# Patient Record
Sex: FEMALE | Race: WHITE | NOT HISPANIC OR LATINO | URBAN - METROPOLITAN AREA
[De-identification: names, ages, dates, MRNs, and addresses within clinical notes are randomized per-mention and may not be internally consistent; named-entity substitution may affect disease eponyms.]

---

## 2020-06-09 ENCOUNTER — TELEPHONE ENCOUNTER (OUTPATIENT)
Dept: URBAN - METROPOLITAN AREA CLINIC 19 | Facility: CLINIC | Age: 75
End: 2020-06-09

## 2020-06-17 ENCOUNTER — OFFICE VISIT (OUTPATIENT)
Dept: URBAN - METROPOLITAN AREA CLINIC 19 | Facility: CLINIC | Age: 75
End: 2020-06-17
Payer: COMMERCIAL

## 2020-06-17 DIAGNOSIS — K59.01 SLOW TRANSIT CONSTIPATION: ICD-10-CM

## 2020-06-17 DIAGNOSIS — K57.31 DIVERTICULOSIS OF LARGE INTESTINE WITH HEMORRHAGE: ICD-10-CM

## 2020-06-17 PROCEDURE — 3017F COLORECTAL CA SCREEN DOC REV: CPT | Performed by: INTERNAL MEDICINE

## 2020-06-17 PROCEDURE — G8427 DOCREV CUR MEDS BY ELIG CLIN: HCPCS | Performed by: INTERNAL MEDICINE

## 2020-06-17 PROCEDURE — G9903 PT SCRN TBCO ID AS NON USER: HCPCS | Performed by: INTERNAL MEDICINE

## 2020-06-17 PROCEDURE — 99214 OFFICE O/P EST MOD 30 MIN: CPT | Performed by: INTERNAL MEDICINE

## 2020-06-17 PROCEDURE — G8417 CALC BMI ABV UP PARAM F/U: HCPCS | Performed by: INTERNAL MEDICINE

## 2020-06-17 RX ORDER — MELATONIN 5 MG
TABLET ORAL
Qty: 0 | Refills: 0 | Status: ACTIVE | COMMUNITY
Start: 1900-01-01

## 2020-06-17 RX ORDER — LINACLOTIDE 290 UG/1
TAKE 1 CAPSULE (290 MCG) BY ORAL ROUTE ONCE DAILY ON AN EMPTY STOMACH AT LEAST 30 MINUTES BEFORE 1ST MEAL OF THE DAY CAPSULE, GELATIN COATED ORAL 1
Qty: 0 | Refills: 0 | Status: ACTIVE | COMMUNITY
Start: 1900-01-01

## 2020-06-17 RX ORDER — SIMVASTATIN 40 MG/1
TAKE 1 TABLET (40 MG) BY ORAL ROUTE ONCE DAILY IN THE EVENING TABLET, FILM COATED ORAL 1
Qty: 0 | Refills: 0 | Status: ACTIVE | COMMUNITY
Start: 1900-01-01

## 2020-06-17 RX ORDER — LOSARTAN POTASSIUM AND HYDROCHLOROTHIAZIDE 100; 25 MG/1; MG/1
TAKE 1 TABLET BY ORAL ROUTE ONCE DAILY TABLET, FILM COATED ORAL 1
Qty: 0 | Refills: 0 | Status: ACTIVE | COMMUNITY
Start: 1900-01-01

## 2020-06-17 RX ORDER — AMLODIPINE BESYLATE 5 MG/1
TAKE 1 TABLET (5 MG) BY ORAL ROUTE ONCE DAILY TABLET ORAL 1
Qty: 0 | Refills: 0 | Status: ACTIVE | COMMUNITY
Start: 1900-01-01

## 2020-06-17 RX ORDER — SOLIFENACIN SUCCINATE 10 MG/1
TAKE 1 TABLET (10 MG) BY ORAL ROUTE ONCE DAILY TABLET, FILM COATED ORAL 1
Qty: 0 | Refills: 0 | Status: ACTIVE | COMMUNITY
Start: 1900-01-01

## 2020-06-17 RX ORDER — LINACLOTIDE 290 UG/1
1 CAPSULE AT LEAST 30 MINUTES BEFORE THE FIRST MEAL OF THE DAY ON AN EMPTY STOMACH CAPSULE, GELATIN COATED ORAL ONCE A DAY
Qty: 90 | Refills: 3 | OUTPATIENT
Start: 2020-06-17 | End: 2021-06-12

## 2020-06-17 RX ORDER — EZETIMIBE 10 MG/1
TABLET ORAL
Qty: 0 | Refills: 0 | Status: ACTIVE | COMMUNITY
Start: 1900-01-01

## 2020-06-17 RX ORDER — MELOXICAM 7.5 MG/1
TAKE 1 TABLET (7.5 MG) BY ORAL ROUTE ONCE DAILY TABLET ORAL 1
Qty: 0 | Refills: 0 | Status: ACTIVE | COMMUNITY
Start: 1900-01-01

## 2020-06-17 RX ORDER — PLECANATIDE 3 MG/1
TAKE ONE TABLET BY MOUTH ONE TIME DAILY TABLET ORAL
Qty: 90 | Refills: 3 | Status: ACTIVE | COMMUNITY
Start: 2019-12-31

## 2020-06-17 RX ORDER — CHLORHEXIDINE GLUCONATE 4 %
LIQUID (ML) TOPICAL
Qty: 0 | Refills: 0 | Status: ACTIVE | COMMUNITY
Start: 1900-01-01

## 2020-06-17 RX ORDER — VENLAFAXINE HYDROCHLORIDE 75 MG/1
TABLET ORAL
Qty: 0 | Refills: 0 | Status: ACTIVE | COMMUNITY
Start: 1900-01-01

## 2020-06-17 NOTE — HPI-TODAY'S VISIT:
The patient is a 72 year old /White female who is being seen in follow up after recent hospitalization.  7/9/18: Patient does complain of some constipation issues. For the past week, she has had tenesmus with varying consistency of stools, sometimes hard and sometimes soft. She has used glycerine suppositories with some success. This usually leads to one large bowel movement with relief of tenesmus for a few days.   8/28/18: Patient presents today for reevaluation of constipation. On 7/16/18, I performed a colonscopy that revealed 2 polyps and sigmoid diverticulosis. On 7/26/18, she had a Sitz marker study consistent with colonic inertia (all markers in the left colon), although there may be an element of pelvic floor dysfunction as well. I had prescribed Amitiza 24 mcg po BID at her last visit. I had ordered anorectal manometry and referred patient to [facility] to try biofeedback or other therapies. She is scheduled for manometry next month and seeing [facility] tomorrow. Amitiza did not work.  6/18/20: Patient presents for follow-up after recent hospitalization (5/23/20-5/26/20) at [facility].  She apparently had a syncopal episode while in the bathroom and had intense abdominal pain followed by hematochezia soon afterwards.  CT showed "colitis" of the descending colon and proximal sigmoid colon.  Colonoscopy performed by Dr. Nina Lara did show some erythemia in that area, but the biopsies were completely normal.  It is my belief that the patient had a diverticular bleed that resolved quickly, although I must admit that the pain is somewhat atypical.  In either case, she has had no bleeding since.  She has been taking meloxicam without any adverse events which goes against IBD, but she had been prescribed Rowasa enemas to take nightly, which she did for a couple of weeks.  I have low suspicion for IBD at this point, given the normal biopsies.  She mainly complains of constipation again - she has not had a BM in 5 days.

## 2020-06-25 ENCOUNTER — TELEPHONE ENCOUNTER (OUTPATIENT)
Dept: URBAN - METROPOLITAN AREA CLINIC 92 | Facility: CLINIC | Age: 75
End: 2020-06-25

## 2020-09-15 ENCOUNTER — OFFICE VISIT (OUTPATIENT)
Dept: URBAN - METROPOLITAN AREA CLINIC 19 | Facility: CLINIC | Age: 75
End: 2020-09-15
Payer: COMMERCIAL

## 2020-09-15 DIAGNOSIS — K57.31 DIVERTICULOSIS OF LARGE INTESTINE WITH HEMORRHAGE: ICD-10-CM

## 2020-09-15 DIAGNOSIS — K59.01 SLOW TRANSIT CONSTIPATION: ICD-10-CM

## 2020-09-15 PROCEDURE — G8420 CALC BMI NORM PARAMETERS: HCPCS | Performed by: INTERNAL MEDICINE

## 2020-09-15 PROCEDURE — 3017F COLORECTAL CA SCREEN DOC REV: CPT | Performed by: INTERNAL MEDICINE

## 2020-09-15 PROCEDURE — 99213 OFFICE O/P EST LOW 20 MIN: CPT | Performed by: INTERNAL MEDICINE

## 2020-09-15 PROCEDURE — G9903 PT SCRN TBCO ID AS NON USER: HCPCS | Performed by: INTERNAL MEDICINE

## 2020-09-15 PROCEDURE — G8427 DOCREV CUR MEDS BY ELIG CLIN: HCPCS | Performed by: INTERNAL MEDICINE

## 2020-09-15 RX ORDER — SOLIFENACIN SUCCINATE 10 MG/1
TAKE 1 TABLET (10 MG) BY ORAL ROUTE ONCE DAILY TABLET, FILM COATED ORAL 1
Qty: 0 | Refills: 0 | Status: ACTIVE | COMMUNITY
Start: 1900-01-01

## 2020-09-15 RX ORDER — CHLORHEXIDINE GLUCONATE 4 %
LIQUID (ML) TOPICAL
Qty: 0 | Refills: 0 | Status: ACTIVE | COMMUNITY
Start: 1900-01-01

## 2020-09-15 RX ORDER — SIMVASTATIN 40 MG/1
TAKE 1 TABLET (40 MG) BY ORAL ROUTE ONCE DAILY IN THE EVENING TABLET, FILM COATED ORAL 1
Qty: 0 | Refills: 0 | Status: ACTIVE | COMMUNITY
Start: 1900-01-01

## 2020-09-15 RX ORDER — LUBIPROSTONE 24 UG/1
1 CAPSULE WITH FOOD AND WATER CAPSULE, GELATIN COATED ORAL TWICE A DAY
Qty: 180 CAPSULE | Refills: 3 | OUTPATIENT
Start: 2020-09-15 | End: 2021-09-10

## 2020-09-15 RX ORDER — PLECANATIDE 3 MG/1
TAKE ONE TABLET BY MOUTH ONE TIME DAILY TABLET ORAL
Qty: 90 | Refills: 3 | Status: ACTIVE | COMMUNITY
Start: 2019-12-31

## 2020-09-15 RX ORDER — MELATONIN 5 MG
TABLET ORAL
Qty: 0 | Refills: 0 | Status: ACTIVE | COMMUNITY
Start: 1900-01-01

## 2020-09-15 RX ORDER — VENLAFAXINE HYDROCHLORIDE 75 MG/1
TABLET ORAL
Qty: 0 | Refills: 0 | Status: ACTIVE | COMMUNITY
Start: 1900-01-01

## 2020-09-15 RX ORDER — LOSARTAN POTASSIUM AND HYDROCHLOROTHIAZIDE 100; 25 MG/1; MG/1
TAKE 1 TABLET BY ORAL ROUTE ONCE DAILY TABLET, FILM COATED ORAL 1
Qty: 0 | Refills: 0 | Status: ACTIVE | COMMUNITY
Start: 1900-01-01

## 2020-09-15 RX ORDER — AMLODIPINE BESYLATE 5 MG/1
TAKE 1 TABLET (5 MG) BY ORAL ROUTE ONCE DAILY TABLET ORAL 1
Qty: 0 | Refills: 0 | Status: ACTIVE | COMMUNITY
Start: 1900-01-01

## 2020-09-15 RX ORDER — MELOXICAM 7.5 MG/1
TAKE 1 TABLET (7.5 MG) BY ORAL ROUTE ONCE DAILY TABLET ORAL 1
Qty: 0 | Refills: 0 | Status: ACTIVE | COMMUNITY
Start: 1900-01-01

## 2020-09-15 RX ORDER — LINACLOTIDE 290 UG/1
TAKE 1 CAPSULE (290 MCG) BY ORAL ROUTE ONCE DAILY ON AN EMPTY STOMACH AT LEAST 30 MINUTES BEFORE 1ST MEAL OF THE DAY CAPSULE, GELATIN COATED ORAL 1
Qty: 0 | Refills: 0 | Status: ACTIVE | COMMUNITY
Start: 1900-01-01

## 2020-09-15 RX ORDER — LINACLOTIDE 290 UG/1
1 CAPSULE AT LEAST 30 MINUTES BEFORE THE FIRST MEAL OF THE DAY ON AN EMPTY STOMACH CAPSULE, GELATIN COATED ORAL ONCE A DAY
Qty: 90 | Refills: 3 | Status: ACTIVE | COMMUNITY
Start: 2020-06-17 | End: 2021-06-12

## 2020-09-15 RX ORDER — EZETIMIBE 10 MG/1
TABLET ORAL
Qty: 0 | Refills: 0 | Status: ACTIVE | COMMUNITY
Start: 1900-01-01

## 2020-09-15 NOTE — HPI-TODAY'S VISIT:
The patient is a 72 year old /White female who is being seen in follow up after recent hospitalization.  7/9/18: Patient does complain of some constipation issues. For the past week, she has had tenesmus with varying consistency of stools, sometimes hard and sometimes soft. She has used glycerine suppositories with some success. This usually leads to one large bowel movement with relief of tenesmus for a few days.   8/28/18: Patient presents today for reevaluation of constipation. On 7/16/18, I performed a colonscopy that revealed 2 polyps and sigmoid diverticulosis. On 7/26/18, she had a Sitz marker study consistent with colonic inertia (all markers in the left colon), although there may be an element of pelvic floor dysfunction as well. I had prescribed Amitiza 24 mcg po BID at her last visit. I had ordered anorectal manometry and referred patient to [facility] to try biofeedback or other therapies. She is scheduled for manometry next month and seeing [facility] tomorrow. Amitiza did not work.  6/18/20: Patient presents for follow-up after recent hospitalization (5/23/20-5/26/20) at [facility].  She apparently had a syncopal episode while in the bathroom and had intense abdominal pain followed by hematochezia soon afterwards.  CT showed "colitis" of the descending colon and proximal sigmoid colon.  Colonoscopy performed by Dr. Nina Lara did show some erythemia in that area, but the biopsies were completely normal.  It is my belief that the patient had a diverticular bleed that resolved quickly, although I must admit that the pain is somewhat atypical.  In either case, she has had no bleeding since.  She has been taking meloxicam without any adverse events which goes against IBD, but she had been prescribed Rowasa enemas to take nightly, which she did for a couple of weeks.  I have low suspicion for IBD at this point, given the normal biopsies.  She mainly complains of constipation again - she has not had a BM in 5 days.  9/15/20:  Patient still not able to have a BM except for once a week. Linzess with Trulance not working.

## 2020-09-23 ENCOUNTER — WEB ENCOUNTER (OUTPATIENT)
Dept: URBAN - METROPOLITAN AREA CLINIC 19 | Facility: CLINIC | Age: 75
End: 2020-09-23

## 2020-09-23 ENCOUNTER — OFFICE VISIT (OUTPATIENT)
Dept: URBAN - METROPOLITAN AREA CLINIC 19 | Facility: CLINIC | Age: 75
End: 2020-09-23
Payer: COMMERCIAL

## 2020-09-23 DIAGNOSIS — K57.31 DIVERTICULOSIS OF LARGE INTESTINE WITH HEMORRHAGE: ICD-10-CM

## 2020-09-23 DIAGNOSIS — K59.01 SLOW TRANSIT CONSTIPATION: ICD-10-CM

## 2020-09-23 DIAGNOSIS — Z12.11 COLON CANCER SCREENING: ICD-10-CM

## 2020-09-23 PROBLEM — 724538004: Status: ACTIVE | Noted: 2020-06-17

## 2020-09-23 PROBLEM — 35298007: Status: ACTIVE | Noted: 2020-06-17

## 2020-09-23 PROCEDURE — 99214 OFFICE O/P EST MOD 30 MIN: CPT | Performed by: INTERNAL MEDICINE

## 2020-09-23 PROCEDURE — G8427 DOCREV CUR MEDS BY ELIG CLIN: HCPCS | Performed by: INTERNAL MEDICINE

## 2020-09-23 PROCEDURE — 3017F COLORECTAL CA SCREEN DOC REV: CPT | Performed by: INTERNAL MEDICINE

## 2020-09-23 PROCEDURE — G8417 CALC BMI ABV UP PARAM F/U: HCPCS | Performed by: INTERNAL MEDICINE

## 2020-09-23 PROCEDURE — G9903 PT SCRN TBCO ID AS NON USER: HCPCS | Performed by: INTERNAL MEDICINE

## 2020-09-23 RX ORDER — BISACODYL 10 MG
1 SUPPOSITORY AS NEEDED SUPPOSITORY, RECTAL RECTAL ONCE A DAY
Qty: 30 | Refills: 3 | OUTPATIENT

## 2020-09-23 RX ORDER — MELOXICAM 7.5 MG/1
TAKE 1 TABLET (7.5 MG) BY ORAL ROUTE ONCE DAILY TABLET ORAL 1
Qty: 0 | Refills: 0 | Status: ACTIVE | COMMUNITY
Start: 1900-01-01

## 2020-09-23 RX ORDER — LINACLOTIDE 290 UG/1
1 CAPSULE AT LEAST 30 MINUTES BEFORE THE FIRST MEAL OF THE DAY ON AN EMPTY STOMACH CAPSULE, GELATIN COATED ORAL ONCE A DAY
Qty: 90 | Refills: 3 | Status: ACTIVE | COMMUNITY
Start: 2020-06-17 | End: 2021-06-12

## 2020-09-23 RX ORDER — LINACLOTIDE 290 UG/1
TAKE 1 CAPSULE (290 MCG) BY ORAL ROUTE ONCE DAILY ON AN EMPTY STOMACH AT LEAST 30 MINUTES BEFORE 1ST MEAL OF THE DAY CAPSULE, GELATIN COATED ORAL 1
Qty: 0 | Refills: 0 | Status: ACTIVE | COMMUNITY
Start: 1900-01-01

## 2020-09-23 RX ORDER — LINACLOTIDE 290 UG/1
1 CAPSULE AT LEAST 30 MINUTES BEFORE THE FIRST MEAL OF THE DAY ON AN EMPTY STOMACH CAPSULE, GELATIN COATED ORAL ONCE A DAY
OUTPATIENT
Start: 2020-06-17 | End: 2021-06-12

## 2020-09-23 RX ORDER — LUBIPROSTONE 24 UG/1
1 CAPSULE WITH FOOD AND WATER CAPSULE, GELATIN COATED ORAL TWICE A DAY
OUTPATIENT
Start: 2020-09-15 | End: 2021-09-10

## 2020-09-23 RX ORDER — LINACLOTIDE 290 UG/1
TAKE 1 CAPSULE (290 MCG) BY ORAL ROUTE ONCE DAILY ON AN EMPTY STOMACH AT LEAST 30 MINUTES BEFORE 1ST MEAL OF THE DAY CAPSULE, GELATIN COATED ORAL 1
OUTPATIENT
Start: 1900-01-01

## 2020-09-23 RX ORDER — VENLAFAXINE HYDROCHLORIDE 75 MG/1
TABLET ORAL
Qty: 0 | Refills: 0 | Status: ACTIVE | COMMUNITY
Start: 1900-01-01

## 2020-09-23 RX ORDER — AMLODIPINE BESYLATE 5 MG/1
TAKE 1 TABLET (5 MG) BY ORAL ROUTE ONCE DAILY TABLET ORAL 1
Qty: 0 | Refills: 0 | Status: ACTIVE | COMMUNITY
Start: 1900-01-01

## 2020-09-23 RX ORDER — PLECANATIDE 3 MG/1
TAKE ONE TABLET BY MOUTH ONE TIME DAILY TABLET ORAL
Qty: 90 | Refills: 3 | Status: ACTIVE | COMMUNITY
Start: 2019-12-31

## 2020-09-23 RX ORDER — PRUCALOPRIDE 2 MG/1
1 TABLET TABLET, FILM COATED ORAL ONCE A DAY
Qty: 90 TABLET | Refills: 3 | OUTPATIENT
Start: 2020-09-23 | End: 2021-09-18

## 2020-09-23 RX ORDER — CHLORHEXIDINE GLUCONATE 4 %
LIQUID (ML) TOPICAL
Qty: 0 | Refills: 0 | Status: ACTIVE | COMMUNITY
Start: 1900-01-01

## 2020-09-23 RX ORDER — PLECANATIDE 3 MG/1
TAKE ONE TABLET BY MOUTH ONE TIME DAILY TABLET ORAL
OUTPATIENT
Start: 2019-12-31

## 2020-09-23 RX ORDER — MELATONIN 5 MG
TABLET ORAL
Qty: 0 | Refills: 0 | Status: ACTIVE | COMMUNITY
Start: 1900-01-01

## 2020-09-23 RX ORDER — EZETIMIBE 10 MG/1
TABLET ORAL
Qty: 0 | Refills: 0 | Status: ACTIVE | COMMUNITY
Start: 1900-01-01

## 2020-09-23 RX ORDER — SIMVASTATIN 40 MG/1
TAKE 1 TABLET (40 MG) BY ORAL ROUTE ONCE DAILY IN THE EVENING TABLET, FILM COATED ORAL 1
Qty: 0 | Refills: 0 | Status: ACTIVE | COMMUNITY
Start: 1900-01-01

## 2020-09-23 RX ORDER — LOSARTAN POTASSIUM AND HYDROCHLOROTHIAZIDE 100; 25 MG/1; MG/1
TAKE 1 TABLET BY ORAL ROUTE ONCE DAILY TABLET, FILM COATED ORAL 1
Qty: 0 | Refills: 0 | Status: ACTIVE | COMMUNITY
Start: 1900-01-01

## 2020-09-23 RX ORDER — SOLIFENACIN SUCCINATE 10 MG/1
TAKE 1 TABLET (10 MG) BY ORAL ROUTE ONCE DAILY TABLET, FILM COATED ORAL 1
Qty: 0 | Refills: 0 | Status: ACTIVE | COMMUNITY
Start: 1900-01-01

## 2020-09-23 RX ORDER — LUBIPROSTONE 24 UG/1
1 CAPSULE WITH FOOD AND WATER CAPSULE, GELATIN COATED ORAL TWICE A DAY
Qty: 180 CAPSULE | Refills: 3 | Status: ACTIVE | COMMUNITY
Start: 2020-09-15 | End: 2021-09-10

## 2020-09-23 NOTE — HPI-TODAY'S VISIT:
The patient is a 72 year old /White female who is being seen in follow up after recent hospitalization.   7/9/18: Patient does complain of some constipation issues. For the past week, she has had tenesmus with varying consistency of stools, sometimes hard and sometimes soft. She has used glycerine suppositories with some success. This usually leads to one large bowel movement with relief of tenesmus for a few days.   8/28/18: Patient presents today for reevaluation of constipation. On 7/16/18, I performed a colonscopy that revealed 2 polyps and sigmoid diverticulosis. On 7/26/18, she had a Sitz marker study consistent with colonic inertia (all markers in the left colon), although there may be an element of pelvic floor dysfunction as well. I had prescribed Amitiza 24 mcg po BID at her last visit. I had ordered anorectal manometry and referred patient to [facility] to try biofeedback or other therapies. She is scheduled for manometry next month and seeing [facility] tomorrow. Amitiza did not work.  6/18/20: Patient presents for follow-up after recent hospitalization (5/23/20-5/26/20) at [facility].  She apparently had a syncopal episode while in the bathroom and had intense abdominal pain followed by hematochezia soon afterwards.  CT showed "colitis" of the descending colon and proximal sigmoid colon.  Colonoscopy performed by Dr. Nina Lara did show some erythemia in that area, but the biopsies were completely normal.  It is my belief that the patient had a diverticular bleed that resolved quickly, although I must admit that the pain is somewhat atypical.  In either case, she has had no bleeding since.  She has been taking meloxicam without any adverse events which goes against IBD, but she had been prescribed Rowasa enemas to take nightly, which she did for a couple of weeks.  I have low suspicion for IBD at this point, given the normal biopsies.  She mainly complains of constipation again - she has not had a BM in 5 days.  9/15/20:  Patient still not able to have a BM except for once a week. Linzess with Trulance not working.  9/23/2020:  she comes in for follow up.  has been unable to go to  her bowels on amitiza 24.  previously failed linzess, trulance, colace, enemas, or sennakot.   she believes she had pelvic floor therapy in the back with relief back then but has lost benefit recently no assoc hemazothezia she is having abd pressure/bloating without elsa pain she is drinking ample water she walks regularly but unable to advance her constipation no n/v normal appetite stable weight  no other complaints.    last colon in the hospital in 5/2020 discussed above.

## 2020-09-29 ENCOUNTER — OFFICE VISIT (OUTPATIENT)
Dept: URBAN - METROPOLITAN AREA CLINIC 19 | Facility: CLINIC | Age: 75
End: 2020-09-29
Payer: COMMERCIAL

## 2020-09-29 ENCOUNTER — DASHBOARD ENCOUNTERS (OUTPATIENT)
Age: 75
End: 2020-09-29

## 2020-09-29 DIAGNOSIS — K59.01 SLOW TRANSIT CONSTIPATION: ICD-10-CM

## 2020-09-29 DIAGNOSIS — K57.31 DIVERTICULOSIS OF LARGE INTESTINE WITH HEMORRHAGE: ICD-10-CM

## 2020-09-29 DIAGNOSIS — Z12.11 COLON CANCER SCREENING: ICD-10-CM

## 2020-09-29 PROCEDURE — G8417 CALC BMI ABV UP PARAM F/U: HCPCS | Performed by: INTERNAL MEDICINE

## 2020-09-29 PROCEDURE — G9903 PT SCRN TBCO ID AS NON USER: HCPCS | Performed by: INTERNAL MEDICINE

## 2020-09-29 PROCEDURE — G8427 DOCREV CUR MEDS BY ELIG CLIN: HCPCS | Performed by: INTERNAL MEDICINE

## 2020-09-29 PROCEDURE — 3017F COLORECTAL CA SCREEN DOC REV: CPT | Performed by: INTERNAL MEDICINE

## 2020-09-29 PROCEDURE — 99213 OFFICE O/P EST LOW 20 MIN: CPT | Performed by: INTERNAL MEDICINE

## 2020-09-29 RX ORDER — MELATONIN 5 MG
TABLET ORAL
Qty: 0 | Refills: 0 | Status: ACTIVE | COMMUNITY
Start: 1900-01-01

## 2020-09-29 RX ORDER — LOSARTAN POTASSIUM AND HYDROCHLOROTHIAZIDE 100; 25 MG/1; MG/1
TAKE 1 TABLET BY ORAL ROUTE ONCE DAILY TABLET, FILM COATED ORAL 1
Qty: 0 | Refills: 0 | Status: ACTIVE | COMMUNITY
Start: 1900-01-01

## 2020-09-29 RX ORDER — AMLODIPINE BESYLATE 5 MG/1
TAKE 1 TABLET (5 MG) BY ORAL ROUTE ONCE DAILY TABLET ORAL 1
Qty: 0 | Refills: 0 | Status: ACTIVE | COMMUNITY
Start: 1900-01-01

## 2020-09-29 RX ORDER — EZETIMIBE 10 MG/1
TABLET ORAL
Qty: 0 | Refills: 0 | Status: ACTIVE | COMMUNITY
Start: 1900-01-01

## 2020-09-29 RX ORDER — MELOXICAM 7.5 MG/1
TAKE 1 TABLET (7.5 MG) BY ORAL ROUTE ONCE DAILY TABLET ORAL 1
Qty: 0 | Refills: 0 | Status: ACTIVE | COMMUNITY
Start: 1900-01-01

## 2020-09-29 RX ORDER — SIMVASTATIN 40 MG/1
TAKE 1 TABLET (40 MG) BY ORAL ROUTE ONCE DAILY IN THE EVENING TABLET, FILM COATED ORAL 1
Qty: 0 | Refills: 0 | Status: ACTIVE | COMMUNITY
Start: 1900-01-01

## 2020-09-29 RX ORDER — BISACODYL 10 MG
1 SUPPOSITORY AS NEEDED SUPPOSITORY, RECTAL RECTAL ONCE A DAY
Qty: 30 | Refills: 3 | Status: ACTIVE | COMMUNITY

## 2020-09-29 RX ORDER — PRUCALOPRIDE 2 MG/1
1 TABLET TABLET, FILM COATED ORAL ONCE A DAY
Qty: 90 TABLET | Refills: 3 | Status: ACTIVE | COMMUNITY
Start: 2020-09-23 | End: 2021-09-18

## 2020-09-29 RX ORDER — SOLIFENACIN SUCCINATE 10 MG/1
TAKE 1 TABLET (10 MG) BY ORAL ROUTE ONCE DAILY TABLET, FILM COATED ORAL 1
Qty: 0 | Refills: 0 | Status: ACTIVE | COMMUNITY
Start: 1900-01-01

## 2020-09-29 RX ORDER — VENLAFAXINE HYDROCHLORIDE 75 MG/1
TABLET ORAL
Qty: 0 | Refills: 0 | Status: ACTIVE | COMMUNITY
Start: 1900-01-01

## 2020-09-29 RX ORDER — CHLORHEXIDINE GLUCONATE 4 %
LIQUID (ML) TOPICAL
Qty: 0 | Refills: 0 | Status: ACTIVE | COMMUNITY
Start: 1900-01-01

## 2020-09-29 NOTE — HPI-TODAY'S VISIT:
The patient is a 72 year old /White female who is being seen in follow up after recent hospitalization.   7/9/18: Patient does complain of some constipation issues. For the past week, she has had tenesmus with varying consistency of stools, sometimes hard and sometimes soft. She has used glycerine suppositories with some success. This usually leads to one large bowel movement with relief of tenesmus for a few days.   8/28/18: Patient presents today for reevaluation of constipation. On 7/16/18, I performed a colonscopy that revealed 2 polyps and sigmoid diverticulosis. On 7/26/18, she had a Sitz marker study consistent with colonic inertia (all markers in the left colon), although there may be an element of pelvic floor dysfunction as well. I had prescribed Amitiza 24 mcg po BID at her last visit. I had ordered anorectal manometry and referred patient to [facility] to try biofeedback or other therapies. She is scheduled for manometry next month and seeing [facility] tomorrow. Amitiza did not work.  6/18/20: Patient presents for follow-up after recent hospitalization (5/23/20-5/26/20) at [facility].  She apparently had a syncopal episode while in the bathroom and had intense abdominal pain followed by hematochezia soon afterwards.  CT showed "colitis" of the descending colon and proximal sigmoid colon.  Colonoscopy performed by Dr. Nina Lara did show some erythemia in that area, but the biopsies were completely normal.  It is my belief that the patient had a diverticular bleed that resolved quickly, although I must admit that the pain is somewhat atypical.  In either case, she has had no bleeding since.  She has been taking meloxicam without any adverse events which goes against IBD, but she had been prescribed Rowasa enemas to take nightly, which she did for a couple of weeks.  I have low suspicion for IBD at this point, given the normal biopsies.  She mainly complains of constipation again - she has not had a BM in 5 days.  9/15/20:  Patient still not able to have a BM except for once a week. Linzess with Trulance not working.  9/23/2020:  she comes in for follow up.  has been unable to go to  her bowels on amitiza 24.  previously failed linzess, trulance, colace, enemas, or sennakot.   she believes she had pelvic floor therapy in the back with relief back then but has lost benefit recently no assoc hemazothezia she is having abd pressure/bloating without elsa pain she is drinking ample water she walks regularly but unable to advance her constipation no n/v normal appetite stable weight  no other complaints.    last colon in the hospital in 5/2020 discussed above.  9/29/20:  Patient presents for rapid follow-up after seeing my colleague, Dr. Jin Rodriguez, less than a week ago.  She states that she already had the appointment, so she decided to keep it.  Dr. Rodriguez addressed her concerns that she had not had a "good" bowel movement in weeks, and he had ordered a MR defecography, which is scheduled for tomorrow.  Motegrity did not work, so she believes that she must have a blockage, either due to pelvic floor dysfunction, a rectocele, or rectal prolapse.

## 2020-09-30 ENCOUNTER — LAB OUTSIDE AN ENCOUNTER (OUTPATIENT)
Dept: URBAN - METROPOLITAN AREA CLINIC 80 | Facility: CLINIC | Age: 75
End: 2020-09-30

## 2020-09-30 ENCOUNTER — TELEPHONE ENCOUNTER (OUTPATIENT)
Dept: URBAN - METROPOLITAN AREA CLINIC 19 | Facility: CLINIC | Age: 75
End: 2020-09-30

## 2020-10-06 ENCOUNTER — TELEPHONE ENCOUNTER (OUTPATIENT)
Dept: URBAN - METROPOLITAN AREA CLINIC 92 | Facility: CLINIC | Age: 75
End: 2020-10-06

## 2020-10-06 PROBLEM — 14760008 CONSTIPATION: Status: ACTIVE | Noted: 2020-10-06

## 2020-10-13 ENCOUNTER — LAB OUTSIDE AN ENCOUNTER (OUTPATIENT)
Dept: URBAN - METROPOLITAN AREA CLINIC 92 | Facility: CLINIC | Age: 75
End: 2020-10-13

## 2020-10-20 ENCOUNTER — OFFICE VISIT (OUTPATIENT)
Dept: URBAN - METROPOLITAN AREA CLINIC 19 | Facility: CLINIC | Age: 75
End: 2020-10-20

## 2020-11-06 ENCOUNTER — OFFICE VISIT (OUTPATIENT)
Dept: URBAN - METROPOLITAN AREA CLINIC 19 | Facility: CLINIC | Age: 75
End: 2020-11-06